# Patient Record
Sex: MALE | Race: WHITE | NOT HISPANIC OR LATINO | Employment: FULL TIME | URBAN - METROPOLITAN AREA
[De-identification: names, ages, dates, MRNs, and addresses within clinical notes are randomized per-mention and may not be internally consistent; named-entity substitution may affect disease eponyms.]

---

## 2023-09-21 PROBLEM — J45.20 INTERMITTENT ASTHMA (HHS-HCC): Status: ACTIVE | Noted: 2020-10-17

## 2023-09-21 PROBLEM — R20.0 RIGHT ARM NUMBNESS: Status: ACTIVE | Noted: 2020-10-17

## 2023-09-21 PROBLEM — R35.0 URINARY FREQUENCY: Status: ACTIVE | Noted: 2020-10-17

## 2023-09-21 PROBLEM — E78.00 BORDERLINE HYPERCHOLESTEROLEMIA: Status: ACTIVE | Noted: 2020-09-17

## 2023-09-21 RX ORDER — ALBUTEROL SULFATE 90 UG/1
2 AEROSOL, METERED RESPIRATORY (INHALATION) 4 TIMES DAILY PRN
COMMUNITY
Start: 2021-12-01 | End: 2023-10-19 | Stop reason: SDUPTHER

## 2023-10-15 ASSESSMENT — PROMIS GLOBAL HEALTH SCALE
EMOTIONAL_PROBLEMS: RARELY
RATE_GENERAL_HEALTH: VERY GOOD
RATE_QUALITY_OF_LIFE: GOOD
CARRYOUT_SOCIAL_ACTIVITIES: VERY GOOD
RATE_MENTAL_HEALTH: VERY GOOD
CARRYOUT_PHYSICAL_ACTIVITIES: COMPLETELY
RATE_PHYSICAL_HEALTH: GOOD
RATE_AVERAGE_FATIGUE: MODERATE
RATE_AVERAGE_PAIN: 1
RATE_SOCIAL_SATISFACTION: VERY GOOD

## 2023-10-19 ENCOUNTER — OFFICE VISIT (OUTPATIENT)
Dept: PRIMARY CARE | Facility: CLINIC | Age: 43
End: 2023-10-19
Payer: COMMERCIAL

## 2023-10-19 VITALS
WEIGHT: 259 LBS | HEIGHT: 77 IN | OXYGEN SATURATION: 99 % | SYSTOLIC BLOOD PRESSURE: 128 MMHG | DIASTOLIC BLOOD PRESSURE: 82 MMHG | HEART RATE: 68 BPM | BODY MASS INDEX: 30.58 KG/M2

## 2023-10-19 DIAGNOSIS — Z83.438 FAMILY HISTORY OF HYPERLIPIDEMIA: ICD-10-CM

## 2023-10-19 DIAGNOSIS — R06.00 DYSPNEA, UNSPECIFIED TYPE: Primary | ICD-10-CM

## 2023-10-19 DIAGNOSIS — Z00.00 WELL ADULT EXAM: ICD-10-CM

## 2023-10-19 DIAGNOSIS — J45.20 MILD INTERMITTENT ASTHMA, UNSPECIFIED WHETHER COMPLICATED (HHS-HCC): ICD-10-CM

## 2023-10-19 PROCEDURE — 99396 PREV VISIT EST AGE 40-64: CPT | Performed by: FAMILY MEDICINE

## 2023-10-19 PROCEDURE — 1036F TOBACCO NON-USER: CPT | Performed by: FAMILY MEDICINE

## 2023-10-19 RX ORDER — ALBUTEROL SULFATE 90 UG/1
2 AEROSOL, METERED RESPIRATORY (INHALATION) 4 TIMES DAILY PRN
Qty: 18 G | Refills: 2 | Status: SHIPPED | OUTPATIENT
Start: 2023-10-19

## 2023-10-19 NOTE — PROGRESS NOTES
Subjective   Patient ID: Eliot Alexandre is a 43 y.o. male who presents for Annual Exam.    HPI   Eliot is seen for his comprehensive physical exam. PMH, PSH, family history and social history were reviewed and updated.   Former smoker quit when in his 20's. Tdap 2017, refuses flu.  Has intermittent asthma for which he uses ProAir w/ relief.      Review of Systems  Constitutional Symptoms: He is negative for fever, loss of appetite, headaches, fatigue.   Eyes: He is negative for loss and blurring of vision, double vision.   Ear, Nose, Mouth, Throat: He is negative for hearing loss, tinnitus, nasal congestion, rhinorrhea, nose bleeds, teeth problems, mouth sores, gum disease, dysphagia, sore throat.   Cardiovascular: He is negative for chest pain/pressure, palpitations, edema, claudication.   Respiratory: He is negative for shortness of breath, dyspnea on exertion, pain with breathing, coughing.   Breast: He is negative for tenderness, masses, gynecomastia.   Gastrointestinal: He is negative for anorexia, indigestion, nausea, vomiting, abdominal pain, change in bowel habits, diarrhea, constipation, hematochaezia, melena, blood in stool.   Musculoskeletal: He is negative for joint pain, joint swelling, myalgias, cramps.   Integumentary: He is negative for change in mole, skin trouble or rash.   Neurological: He is negative for headache, numbness, tingling, weakness, tremors.   Psychiatric: He is negative for depression, anxiety.   Endocrine: He is negative for weight gain, heat or cold intolerance, polyuria, polydipsia, polyphagia.   Hematologic/Lymphatic: He is negative for bruising, abnormal bleeding, swollen glands.Constitutional Symptoms: He is negative for fever, loss of appetite, headaches, fatigue.   Eyes: He is negative for loss and blurring of vision, double vision.   Ear, Nose, Mouth, Throat: He is negative for hearing loss, tinnitus, nasal congestion, rhinorrhea, nose bleeds, teeth problems, mouth sores, gum  "disease, dysphagia, sore throat.   Cardiovascular: He is negative for chest pain/pressure, palpitations, edema, claudication.   Respiratory: He is negative for shortness of breath, dyspnea on exertion, pain with breathing, coughing.   Breast: He is negative for tenderness, masses, gynecomastia.   Gastrointestinal: He is negative for anorexia, indigestion, nausea, vomiting, abdominal pain, change in bowel habits, diarrhea, constipation, hematochaezia, melena, blood in stool.   Musculoskeletal: He is negative for joint pain, joint swelling, myalgias, cramps.   Integumentary: He is negative for change in mole, skin trouble or rash.   Neurological: He is negative for headache, numbness, tingling, weakness, tremors.   Psychiatric: He is negative for depression, anxiety.   Endocrine: He is negative for weight gain, heat or cold intolerance, polyuria, polydipsia, polyphagia.   Hematologic/Lymphatic: He is negative for bruising, abnormal bleeding, swollen glands.    Objective   /82   Pulse 68   Ht 1.943 m (6' 4.5\")   Wt 117 kg (259 lb)   SpO2 99%   BMI 31.12 kg/m²     Physical Exam  General Appearance: Vital Signs have been reviewed. Comfortable. Well nourished, and well developed. He is awake, alert, and oriented and appears his stated age. The patient is cooperative with exam.  Head: Hair pattern reveals a normal pattern for patients age and The face shows no abnormalities .  Eyes: PERRLA, EOMI, conjunctiva and sclerae clear. Extraocular muscle exam reveals EOMI.  Ears, Nose, Mouth, Throat: EARS: External bilateral ears reveals normal helix, tragus and ear lobe. Bilateral canals are normal. Both tympanic membranes are pearly gray and landmarks normal .   NOSE: Nasal mucosa in both nostrils reveals no polyps, ulcerations, or lesions. Teeth reveal good repair. Posterior pharynx reveals no abnormalities.  Neck: Neck reveals supple, no adenopathy, no thyromegaly, or carotid bruits.  Chest: Lungs are clear to " auscultation bilaterally with no wheezes, rales, or rhonchi.  Cardiovascular: RRR without MRG. Bilateral DP pulses are 2+. Extremities reveal no cyanosis, clubbing, or edema.  Abdomen: Abdomen is soft, NT, ND with no masses.  Musculoskeletal: 5/5 and equal strength in bilateral upper and lower extremities. Inspection of right arm is normal. ROM is full w/o pain. Sts numbness is directly above his olecranon.  Skin: Skin reveals good turgor and no rashes .  Neurological: Neuro: Intact and non-focal.  Psychiatric: Patient has appropriate judgement. Patient has good insight. Patient's mood is appropriate.    Assessment/Plan   Problem List Items Addressed This Visit             ICD-10-CM    Intermittent asthma  Stable.  Patient would like an albuterol prescription to use if he needs it in the future.  He has not used his albuterol in years J45.20     Other Visit Diagnoses         Codes    Dyspnea, unspecified type    -  Primary R06.00    Relevant Medications    albuterol (ProAir HFA) 90 mcg/actuation inhaler  Intermittent.  See above.    Well adult exam    Normal exam. Z00.00

## 2024-03-01 ENCOUNTER — LAB (OUTPATIENT)
Dept: LAB | Facility: LAB | Age: 44
End: 2024-03-01
Payer: COMMERCIAL

## 2024-03-01 DIAGNOSIS — Z83.438 FAMILY HISTORY OF HYPERLIPIDEMIA: ICD-10-CM

## 2024-03-01 DIAGNOSIS — Z00.00 WELL ADULT EXAM: ICD-10-CM

## 2024-03-01 LAB
ALBUMIN SERPL-MCNC: 4.9 G/DL (ref 3.5–5)
ALP BLD-CCNC: 51 U/L (ref 35–125)
ALT SERPL-CCNC: 20 U/L (ref 5–40)
ANION GAP SERPL CALC-SCNC: 10 MMOL/L
AST SERPL-CCNC: 24 U/L (ref 5–40)
BASOPHILS # BLD AUTO: 0.01 X10*3/UL (ref 0–0.1)
BASOPHILS NFR BLD AUTO: 0.3 %
BILIRUB SERPL-MCNC: 0.4 MG/DL (ref 0.1–1.2)
BUN SERPL-MCNC: 22 MG/DL (ref 8–25)
CALCIUM SERPL-MCNC: 9.6 MG/DL (ref 8.5–10.4)
CHLORIDE SERPL-SCNC: 102 MMOL/L (ref 97–107)
CHOLEST SERPL-MCNC: 196 MG/DL (ref 133–200)
CHOLEST/HDLC SERPL: 3.8 {RATIO}
CO2 SERPL-SCNC: 28 MMOL/L (ref 24–31)
CREAT SERPL-MCNC: 1 MG/DL (ref 0.4–1.6)
EGFRCR SERPLBLD CKD-EPI 2021: >90 ML/MIN/1.73M*2
EOSINOPHIL # BLD AUTO: 0.03 X10*3/UL (ref 0–0.7)
EOSINOPHIL NFR BLD AUTO: 0.8 %
ERYTHROCYTE [DISTWIDTH] IN BLOOD BY AUTOMATED COUNT: 12.5 % (ref 11.5–14.5)
GLUCOSE SERPL-MCNC: 85 MG/DL (ref 65–99)
HCT VFR BLD AUTO: 48 % (ref 41–52)
HDLC SERPL-MCNC: 51 MG/DL
HGB BLD-MCNC: 16 G/DL (ref 13.5–17.5)
IMM GRANULOCYTES # BLD AUTO: 0.01 X10*3/UL (ref 0–0.7)
IMM GRANULOCYTES NFR BLD AUTO: 0.3 % (ref 0–0.9)
LDLC SERPL CALC-MCNC: 131 MG/DL (ref 65–130)
LYMPHOCYTES # BLD AUTO: 1.67 X10*3/UL (ref 1.2–4.8)
LYMPHOCYTES NFR BLD AUTO: 42.5 %
MCH RBC QN AUTO: 29.4 PG (ref 26–34)
MCHC RBC AUTO-ENTMCNC: 33.3 G/DL (ref 32–36)
MCV RBC AUTO: 88 FL (ref 80–100)
MONOCYTES # BLD AUTO: 0.44 X10*3/UL (ref 0.1–1)
MONOCYTES NFR BLD AUTO: 11.2 %
NEUTROPHILS # BLD AUTO: 1.77 X10*3/UL (ref 1.2–7.7)
NEUTROPHILS NFR BLD AUTO: 44.9 %
NRBC BLD-RTO: 0 /100 WBCS (ref 0–0)
PLATELET # BLD AUTO: 259 X10*3/UL (ref 150–450)
POTASSIUM SERPL-SCNC: 4.5 MMOL/L (ref 3.4–5.1)
PROT SERPL-MCNC: 7.3 G/DL (ref 5.9–7.9)
RBC # BLD AUTO: 5.44 X10*6/UL (ref 4.5–5.9)
SODIUM SERPL-SCNC: 140 MMOL/L (ref 133–145)
TRIGL SERPL-MCNC: 69 MG/DL (ref 40–150)
TSH SERPL DL<=0.05 MIU/L-ACNC: 0.8 MIU/L (ref 0.27–4.2)
WBC # BLD AUTO: 3.9 X10*3/UL (ref 4.4–11.3)

## 2024-03-01 PROCEDURE — 36415 COLL VENOUS BLD VENIPUNCTURE: CPT

## 2024-03-01 PROCEDURE — 85025 COMPLETE CBC W/AUTO DIFF WBC: CPT

## 2024-03-01 PROCEDURE — 80053 COMPREHEN METABOLIC PANEL: CPT

## 2024-03-01 PROCEDURE — 84443 ASSAY THYROID STIM HORMONE: CPT

## 2024-03-01 PROCEDURE — 80061 LIPID PANEL: CPT

## 2024-07-09 ENCOUNTER — TELEPHONE (OUTPATIENT)
Dept: PRIMARY CARE | Facility: CLINIC | Age: 44
End: 2024-07-09
Payer: COMMERCIAL

## 2024-07-09 DIAGNOSIS — R06.00 DYSPNEA, UNSPECIFIED TYPE: ICD-10-CM

## 2024-07-09 RX ORDER — ALBUTEROL SULFATE 90 UG/1
2 AEROSOL, METERED RESPIRATORY (INHALATION) 4 TIMES DAILY PRN
Qty: 18 G | Refills: 2 | OUTPATIENT
Start: 2024-07-09

## 2024-07-09 RX ORDER — ALBUTEROL SULFATE 90 UG/1
2 AEROSOL, METERED RESPIRATORY (INHALATION) 4 TIMES DAILY PRN
Qty: 18 G | Refills: 2 | Status: SHIPPED | OUTPATIENT
Start: 2024-07-09

## 2024-07-09 NOTE — TELEPHONE ENCOUNTER
PATIENT CALLED ON RX LINE TO REQUEST A REFILL OF ALBUTEROL 90 MCG/ACTUATION INHALER BE FORWARDED TO [facility] IN [place].  PLEASE ADVISE.

## 2024-07-09 NOTE — TELEPHONE ENCOUNTER
Patient called Rx line and requested a refill for his Albuterol inhaler.  Pharmacy: [facility] [place]  Patient phone #: [phone]

## 2025-07-11 ENCOUNTER — HOSPITAL ENCOUNTER (EMERGENCY)
Dept: CARDIOLOGY | Facility: HOSPITAL | Age: 45
Discharge: HOME | End: 2025-07-11
Payer: COMMERCIAL

## 2025-07-11 ENCOUNTER — APPOINTMENT (OUTPATIENT)
Dept: RADIOLOGY | Facility: HOSPITAL | Age: 45
End: 2025-07-11
Payer: COMMERCIAL

## 2025-07-11 ENCOUNTER — HOSPITAL ENCOUNTER (EMERGENCY)
Facility: HOSPITAL | Age: 45
Discharge: HOME | End: 2025-07-11
Payer: COMMERCIAL

## 2025-07-11 VITALS
RESPIRATION RATE: 20 BRPM | TEMPERATURE: 98.2 F | HEART RATE: 73 BPM | DIASTOLIC BLOOD PRESSURE: 104 MMHG | BODY MASS INDEX: 31.66 KG/M2 | WEIGHT: 260 LBS | HEIGHT: 76 IN | SYSTOLIC BLOOD PRESSURE: 172 MMHG | OXYGEN SATURATION: 97 %

## 2025-07-11 DIAGNOSIS — I10 PRIMARY HYPERTENSION: ICD-10-CM

## 2025-07-11 DIAGNOSIS — R07.89 CHEST TIGHTNESS: Primary | ICD-10-CM

## 2025-07-11 LAB
ALBUMIN SERPL BCP-MCNC: 4.7 G/DL (ref 3.4–5)
ALP SERPL-CCNC: 45 U/L (ref 33–120)
ALT SERPL W P-5'-P-CCNC: 23 U/L (ref 10–52)
ANION GAP SERPL CALCULATED.3IONS-SCNC: 14 MMOL/L (ref 10–20)
AST SERPL W P-5'-P-CCNC: 27 U/L (ref 9–39)
BASOPHILS # BLD AUTO: 0.01 X10*3/UL (ref 0–0.1)
BASOPHILS NFR BLD AUTO: 0.2 %
BILIRUB SERPL-MCNC: 0.4 MG/DL (ref 0–1.2)
BUN SERPL-MCNC: 21 MG/DL (ref 6–23)
CALCIUM SERPL-MCNC: 9.6 MG/DL (ref 8.6–10.3)
CARDIAC TROPONIN I PNL SERPL HS: 4 NG/L (ref 0–20)
CHLORIDE SERPL-SCNC: 103 MMOL/L (ref 98–107)
CO2 SERPL-SCNC: 25 MMOL/L (ref 21–32)
CREAT SERPL-MCNC: 0.89 MG/DL (ref 0.5–1.3)
EGFRCR SERPLBLD CKD-EPI 2021: >90 ML/MIN/1.73M*2
EOSINOPHIL # BLD AUTO: 0.02 X10*3/UL (ref 0–0.7)
EOSINOPHIL NFR BLD AUTO: 0.4 %
ERYTHROCYTE [DISTWIDTH] IN BLOOD BY AUTOMATED COUNT: 12.2 % (ref 11.5–14.5)
GLUCOSE SERPL-MCNC: 86 MG/DL (ref 74–99)
HCT VFR BLD AUTO: 43.3 % (ref 41–52)
HGB BLD-MCNC: 14.8 G/DL (ref 13.5–17.5)
IMM GRANULOCYTES # BLD AUTO: 0.01 X10*3/UL (ref 0–0.7)
IMM GRANULOCYTES NFR BLD AUTO: 0.2 % (ref 0–0.9)
LIPASE SERPL-CCNC: 38 U/L (ref 9–82)
LYMPHOCYTES # BLD AUTO: 1.72 X10*3/UL (ref 1.2–4.8)
LYMPHOCYTES NFR BLD AUTO: 33.5 %
MAGNESIUM SERPL-MCNC: 2.15 MG/DL (ref 1.6–2.4)
MCH RBC QN AUTO: 29.7 PG (ref 26–34)
MCHC RBC AUTO-ENTMCNC: 34.2 G/DL (ref 32–36)
MCV RBC AUTO: 87 FL (ref 80–100)
MONOCYTES # BLD AUTO: 0.44 X10*3/UL (ref 0.1–1)
MONOCYTES NFR BLD AUTO: 8.6 %
NEUTROPHILS # BLD AUTO: 2.94 X10*3/UL (ref 1.2–7.7)
NEUTROPHILS NFR BLD AUTO: 57.1 %
NRBC BLD-RTO: 0 /100 WBCS (ref 0–0)
PLATELET # BLD AUTO: 224 X10*3/UL (ref 150–450)
POTASSIUM SERPL-SCNC: 4 MMOL/L (ref 3.5–5.3)
PROT SERPL-MCNC: 7.2 G/DL (ref 6.4–8.2)
RBC # BLD AUTO: 4.99 X10*6/UL (ref 4.5–5.9)
SODIUM SERPL-SCNC: 138 MMOL/L (ref 136–145)
WBC # BLD AUTO: 5.1 X10*3/UL (ref 4.4–11.3)

## 2025-07-11 PROCEDURE — 71045 X-RAY EXAM CHEST 1 VIEW: CPT

## 2025-07-11 PROCEDURE — 83690 ASSAY OF LIPASE: CPT

## 2025-07-11 PROCEDURE — 2500000001 HC RX 250 WO HCPCS SELF ADMINISTERED DRUGS (ALT 637 FOR MEDICARE OP)

## 2025-07-11 PROCEDURE — 99285 EMERGENCY DEPT VISIT HI MDM: CPT | Mod: 25

## 2025-07-11 PROCEDURE — 85025 COMPLETE CBC W/AUTO DIFF WBC: CPT

## 2025-07-11 PROCEDURE — 80053 COMPREHEN METABOLIC PANEL: CPT

## 2025-07-11 PROCEDURE — 83735 ASSAY OF MAGNESIUM: CPT

## 2025-07-11 PROCEDURE — 84484 ASSAY OF TROPONIN QUANT: CPT

## 2025-07-11 PROCEDURE — 36415 COLL VENOUS BLD VENIPUNCTURE: CPT

## 2025-07-11 PROCEDURE — 71045 X-RAY EXAM CHEST 1 VIEW: CPT | Performed by: RADIOLOGY

## 2025-07-11 PROCEDURE — 93005 ELECTROCARDIOGRAM TRACING: CPT

## 2025-07-11 PROCEDURE — 99284 EMERGENCY DEPT VISIT MOD MDM: CPT

## 2025-07-11 RX ORDER — LISINOPRIL 10 MG/1
10 TABLET ORAL DAILY
Qty: 30 TABLET | Refills: 0 | Status: SHIPPED | OUTPATIENT
Start: 2025-07-11 | End: 2025-07-11

## 2025-07-11 RX ORDER — LISINOPRIL 10 MG/1
10 TABLET ORAL DAILY
Qty: 30 TABLET | Refills: 0 | Status: SHIPPED | OUTPATIENT
Start: 2025-07-11 | End: 2025-08-10

## 2025-07-11 RX ORDER — LISINOPRIL 10 MG/1
10 TABLET ORAL ONCE
Status: COMPLETED | OUTPATIENT
Start: 2025-07-11 | End: 2025-07-11

## 2025-07-11 RX ADMIN — LISINOPRIL 10 MG: 10 TABLET ORAL at 20:58

## 2025-07-11 ASSESSMENT — PAIN DESCRIPTION - ORIENTATION: ORIENTATION: MID

## 2025-07-11 ASSESSMENT — PAIN - FUNCTIONAL ASSESSMENT: PAIN_FUNCTIONAL_ASSESSMENT: 0-10

## 2025-07-11 ASSESSMENT — PAIN DESCRIPTION - PAIN TYPE: TYPE: ACUTE PAIN

## 2025-07-11 ASSESSMENT — PAIN DESCRIPTION - DESCRIPTORS: DESCRIPTORS: PRESSURE

## 2025-07-11 ASSESSMENT — PAIN SCALES - GENERAL: PAINLEVEL_OUTOF10: 1

## 2025-07-11 ASSESSMENT — PAIN DESCRIPTION - LOCATION: LOCATION: CHEST

## 2025-07-11 NOTE — ED TRIAGE NOTES
Pt ambulatory to triage with the complaint of chest pressure and high blood pressure. Pt sts its not really a pain its more just pressure in huis chest. Pt sts his blood pressure has been high over the past couple of days,.

## 2025-07-12 NOTE — ED PROVIDER NOTES
HPI   Chief Complaint   Patient presents with    Chest Pain     Pt sts chest pressure        HPI  Patient is a 45-year-old male who presents to ED for evaluation of hypertension and mild chest discomfort that started today.  Patient states he is going through a lot of stress with the birth of a child and complications from his wife's labor.  He states he has been exhausted and stressed out, his blood pressure is markedly higher than his baseline.  He states he wanted to come get checked out to make sure he was okay.  He denies any shortness of breath.  No active chest pain.  No abdominal pain or NVD.  Denies any urinary symptoms.  No recent hospitalizations or surgeries.  No recent travel or sick contacts.      Patient History   Medical History[1]  Surgical History[2]  Family History[3]  Social History[4]    Physical Exam   ED Triage Vitals [07/11/25 1840]   Temperature Heart Rate Respirations BP   36.8 °C (98.2 °F) 73 20 (!) 172/104      Pulse Ox Temp Source Heart Rate Source Patient Position   97 % Temporal Monitor Sitting      BP Location FiO2 (%)     Left arm --       Physical Exam  Vitals reviewed.   Constitutional:       General: She is not in acute distress.     Appearance: Normal appearance. She is not ill-appearing.   HENT:      Head: Normocephalic and atraumatic.   Eyes:      Extraocular Movements: Extraocular movements intact.   Cardiovascular:      Rate and Rhythm: Normal rate and regular rhythm.      Heart sounds: Normal heart sounds.   Pulmonary:      Effort: Pulmonary effort is normal.      Breath sounds: Normal breath sounds.   Abdominal:      Palpations: Abdomen is soft.      Tenderness: There is no abdominal tenderness.   Musculoskeletal:         General: Normal range of motion.      Cervical back: Normal range of motion and neck supple.   Skin:     General: Skin is warm and dry.   Neurological:      General: No focal deficit present.      Mental Status: She is alert and oriented to person, place,  "and time.   Psychiatric:         Mood and Affect: Mood normal.         Behavior: Behavior normal.    ED Course & MDM   ED Course as of 07/11/25 2212 Fri Jul 11, 2025 1847 Twelve-lead EKG notes normal sinus rhythm 89 beats minute.  Normal intervals.  No ST elevations or depressions.  Normal EKG. [ML]      ED Course User Index  [ML] Marty R Lejeune, DO         Diagnoses as of 07/11/25 2212   Chest tightness   Primary hypertension                 No data recorded     Lorenzo Coma Scale Score: 15 (07/11/25 1838 : Imelda West RN)                           Medical Decision Making  Parts of this chart have been completed using voice recognition software. Please excuse any errors of transcription.  My thought process and reason for plan has been formulated from the time that I saw the patient until the time of disposition and is not specific to one specific moment during their visit and furthermore my MDM encompasses this entire chart and not only this text box.    HPI:   A medically appropriate HPI was obtained, outlined above.    Eliot Alexandre is a  45 y.o. male    Chief Complaint   Patient presents with    Chest Pain     Pt sts chest pressure        Medical History[5]    Surgical History[6]    Social History[7]    Family History[8]    Allergies[9]    Current Outpatient Medications   Medication Instructions    albuterol (ProAir HFA) 90 mcg/actuation inhaler 2 puffs, inhalation, 4 times daily PRN    lisinopril 10 mg, oral, Daily   for details    Exam:   Patient Vitals for the past 24 hrs:   BP Temp Temp src Pulse Resp SpO2 Height Weight   07/11/25 1840 (!) 172/104 36.8 °C (98.2 °F) Temporal 73 20 97 % 1.93 m (6' 4\") 118 kg (260 lb)       A medically appropriate exam performed, outlined above, given the known history and presentation.    EKG/Cardiac monitor:   If EKG was done and, it was interpreted by attending physician, see their note for ED course for more detail.    Medications given during visit:  Medications "   lisinopril tablet 10 mg (10 mg oral Given 7/11/25 2058)        Diagnostic/tests:  Labs Reviewed   COMPREHENSIVE METABOLIC PANEL - Normal       Result Value    Glucose 86      Sodium 138      Potassium 4.0      Chloride 103      Bicarbonate 25      Anion Gap 14      Urea Nitrogen 21      Creatinine 0.89      eGFR >90      Calcium 9.6      Albumin 4.7      Alkaline Phosphatase 45      Total Protein 7.2      AST 27      Bilirubin, Total 0.4      ALT 23     LIPASE - Normal    Lipase 38      Narrative:     Venipuncture immediately after or during the administration of Metamizole may lead to falsely low results. Testing should be performed immediately prior to Metamizole dosing.   MAGNESIUM - Normal    Magnesium 2.15     SERIAL TROPONIN-INITIAL - Normal    Troponin I, High Sensitivity 4      Narrative:     Less than 99th percentile of normal range cutoff-  Female and children under 18 years old <14 ng/L; Male <21 ng/L: Negative  Repeat testing should be performed if clinically indicated.     Female and children under 18 years old 14-50 ng/L; Male 21-50 ng/L:  Consistent with possible cardiac damage and possible increased clinical   risk. Serial measurements may help to assess extent of myocardial damage.     >50 ng/L: Consistent with cardiac damage, increased clinical risk and  myocardial infarction. Serial measurements may help assess extent of   myocardial damage.      NOTE: Children less than 1 year old may have higher baseline troponin   levels and results should be interpreted in conjunction with the overall   clinical context.     NOTE: Troponin I testing is performed using a different   testing methodology at [facility] than at other   [facility]. Direct result comparisons should only   be made within the same method.   CBC WITH AUTO DIFFERENTIAL    WBC 5.1      nRBC 0.0      RBC 4.99      Hemoglobin 14.8      Hematocrit 43.3      MCV 87      MCH 29.7      MCHC 34.2      RDW 12.2       Platelets 224      Neutrophils % 57.1      Immature Granulocytes %, Automated 0.2      Lymphocytes % 33.5      Monocytes % 8.6      Eosinophils % 0.4      Basophils % 0.2      Neutrophils Absolute 2.94      Immature Granulocytes Absolute, Automated 0.01      Lymphocytes Absolute 1.72      Monocytes Absolute 0.44      Eosinophils Absolute 0.02      Basophils Absolute 0.01     TROPONIN SERIES- (INITIAL, 1 HR)    Narrative:     The following orders were created for panel order Troponin I Series, High Sensitivity (0, 1 HR).  Procedure                               Abnormality         Status                     ---------                               -----------         ------                     Troponin I, High Sensiti...[[id number]]  Normal              Final result               Troponin, High Sensitivi...[[id number]]                                                   Please view results for these tests on the individual orders.   SERIAL TROPONIN, 1 HOUR        XR chest 1 view   Final Result   1.  No evidence of acute cardiopulmonary process.                  MACRO:   None        Signed by: Mike Strong 7/11/2025 7:05 PM   Dictation workstation:   [id number]             [facility] Summary:  Lab work unremarkable today.  Will prescribe lisinopril for 30 days for management of his blood pressure.  Advised patient follow-up with primary care.  Return precautions were discussed.    We have discussed the diagnosis and risks, and we agree with discharging home to follow-up with appropriate physician as directed. We also discussed returning to the Emergency Department immediately if new or worsening symptoms occur. We have discussed the symptoms which are most concerning that necessitate immediate return. Pt symptoms have been well controlled here and the patient is safe for discharge with appropriate outpatient follow up. The patient has verbalized understanding to return to ER without delay for new or worsening pains or for  any other symptoms or concerns. I utilized the discharge clinical management tool provided [employer] to help estimate risk of negative outcome for this patient.        Disposition:  ED Prescriptions       Medication Sig Dispense Start Date End Date Auth. Provider    lisinopril 10 mg tablet  (Status: Discontinued) Take 1 tablet (10 mg) by mouth once daily. 30 tablet 7/11/2025 7/11/2025 Jesus Cash PA-C    lisinopril 10 mg tablet Take 1 tablet (10 mg) by mouth once daily. 30 tablet 7/11/2025 8/10/2025 Jesus Cash PA-C            All of the patient's questions were answered to the best of my ability. Patient states understanding that they have been screened for an emergency today and we have not found any etiology of symptoms that requires emergent treatment or admission to the hospital at this point. They understand that they have not had definitive care day and require follow-up for treatment of their condition. They also state understanding that they may have an emergent condition that may potentially have not of detected at this visit and they must return to the emergency department if they develop any worsening of symptoms or new complaints.       Procedure  Procedures       [1] No past medical history on file.  [2] No past surgical history on file.  [3] No family history on file.  [4]   Social History  Tobacco Use    Smoking status: Never    Smokeless tobacco: Never   Substance Use Topics    Alcohol use: Not on file    Drug use: Not on file   [5] No past medical history on file.  [6] No past surgical history on file.  [7]   Social History  Tobacco Use    Smoking status: Never    Smokeless tobacco: Never   [8] No family history on file.  [9]   Allergies  Allergen Reactions    Cefaclor Hivgrecia Cash PA-C  07/11/25 8020

## 2025-07-16 LAB
ATRIAL RATE: 89 BPM
P AXIS: 91 DEGREES
P OFFSET: 166 MS
P ONSET: 127 MS
PR INTERVAL: 164 MS
Q ONSET: 209 MS
QRS COUNT: 14 BEATS
QRS DURATION: 96 MS
QT INTERVAL: 338 MS
QTC CALCULATION(BAZETT): 411 MS
QTC FREDERICIA: 385 MS
R AXIS: 78 DEGREES
T AXIS: 40 DEGREES
T OFFSET: 378 MS
VENTRICULAR RATE: 89 BPM